# Patient Record
Sex: MALE | Race: WHITE | NOT HISPANIC OR LATINO | Employment: FULL TIME | URBAN - METROPOLITAN AREA
[De-identification: names, ages, dates, MRNs, and addresses within clinical notes are randomized per-mention and may not be internally consistent; named-entity substitution may affect disease eponyms.]

---

## 2023-11-14 ENCOUNTER — HOSPITAL ENCOUNTER (EMERGENCY)
Facility: HOSPITAL | Age: 63
Discharge: HOME | End: 2023-11-14
Attending: EMERGENCY MEDICINE | Admitting: EMERGENCY MEDICINE
Payer: COMMERCIAL

## 2023-11-14 VITALS
OXYGEN SATURATION: 96 % | RESPIRATION RATE: 20 BRPM | DIASTOLIC BLOOD PRESSURE: 113 MMHG | TEMPERATURE: 97.9 F | SYSTOLIC BLOOD PRESSURE: 228 MMHG | HEART RATE: 85 BPM

## 2023-11-14 DIAGNOSIS — M54.9 BACK PAIN: Primary | ICD-10-CM

## 2023-11-14 DIAGNOSIS — I10 HYPERTENSION: ICD-10-CM

## 2023-11-14 PROCEDURE — 99282 EMERGENCY DEPT VISIT SF MDM: CPT

## 2023-11-14 PROCEDURE — 99284 EMERGENCY DEPT VISIT MOD MDM: CPT | Performed by: EMERGENCY MEDICINE

## 2023-11-14 RX ORDER — ATORVASTATIN CALCIUM 80 MG/1
80 TABLET, FILM COATED ORAL DAILY
COMMUNITY
Start: 2023-08-25 | End: 2023-11-14 | Stop reason: SDUPTHER

## 2023-11-14 RX ORDER — ASPIRIN 81 MG/1
81 TABLET ORAL DAILY
COMMUNITY
Start: 2023-08-25 | End: 2023-11-14 | Stop reason: SDUPTHER

## 2023-11-14 RX ORDER — CLOPIDOGREL BISULFATE 75 MG/1
75 TABLET ORAL DAILY
Qty: 14 TABLET | Refills: 0 | Status: SHIPPED | OUTPATIENT
Start: 2023-11-14 | End: 2023-11-28

## 2023-11-14 RX ORDER — ATORVASTATIN CALCIUM 80 MG/1
80 TABLET, FILM COATED ORAL DAILY
Qty: 14 TABLET | Refills: 0 | Status: SHIPPED | OUTPATIENT
Start: 2023-11-14 | End: 2023-11-28

## 2023-11-14 RX ORDER — ASPIRIN 81 MG/1
81 TABLET ORAL DAILY
Qty: 14 TABLET | Refills: 0 | Status: SHIPPED | OUTPATIENT
Start: 2023-11-14 | End: 2023-11-28

## 2023-11-14 RX ORDER — METHOCARBAMOL 500 MG/1
500 TABLET, FILM COATED ORAL 2 TIMES DAILY
Qty: 20 TABLET | Refills: 0 | Status: SHIPPED | OUTPATIENT
Start: 2023-11-14

## 2023-11-14 RX ORDER — CLOPIDOGREL BISULFATE 75 MG/1
75 TABLET ORAL DAILY
COMMUNITY
Start: 2023-08-25 | End: 2023-11-14 | Stop reason: SDUPTHER

## 2023-11-14 RX ORDER — LIDOCAINE 50 MG/G
1 PATCH TOPICAL DAILY
Qty: 14 PATCH | Refills: 0 | Status: SHIPPED | OUTPATIENT
Start: 2023-11-14 | End: 2023-11-28

## 2023-11-14 RX ORDER — LIDOCAINE 50 MG/G
1 PATCH TOPICAL ONCE
Status: DISCONTINUED | OUTPATIENT
Start: 2023-11-14 | End: 2023-11-14 | Stop reason: HOSPADM

## 2023-11-14 RX ADMIN — LIDOCAINE 1 PATCH: 700 PATCH TOPICAL at 07:39

## 2023-11-14 NOTE — Clinical Note
Rosalie Chico was seen and treated in our emergency department on 11/14/2023. No restrictions            Diagnosis:     Althea Dial  may return to work on return date. He may return on this date: 11/15/2023         If you have any questions or concerns, please don't hesitate to call.       Albania Meza, DO    ______________________________           _______________          _______________  Hospital Representative                              Date                                Time

## 2023-11-14 NOTE — ED PROVIDER NOTES
History  Chief Complaint   Patient presents with    Back Pain     Pt reports R lower back pain for 3 months, was on way to work when he decided to stop in. HPI  78-year-old male with history of hepatitis C, PAD  status post left femoral endarterectomy with femoral above-knee popliteal bypass, CAD, hyperlipidemia, hypertension presents to the ED for intermittent right low back pain x3 months. Patient reports pain worse with movement specifically bending over with picking things up at work. Patient reports taking Aleve for pain without much relief yesterday. Patient reports pain so bad yesterday unable to go to work. Currently asymptomatic. Patient denies fever, headache, vision changes, speech changes, focal motor weakness, bladder/bowel incontinence, saddle anesthesia. Prior to Admission Medications   Prescriptions Last Dose Informant Patient Reported? Taking?   aspirin (ECOTRIN LOW STRENGTH) 81 mg EC tablet   Yes Yes   Sig: Take 81 mg by mouth daily   aspirin (ECOTRIN LOW STRENGTH) 81 mg EC tablet   No Yes   Sig: Take 1 tablet (81 mg total) by mouth daily for 14 days   atorvastatin (LIPITOR) 80 mg tablet   Yes Yes   Sig: Take 80 mg by mouth daily   atorvastatin (LIPITOR) 80 mg tablet   No Yes   Sig: Take 1 tablet (80 mg total) by mouth daily for 14 days   clopidogrel (PLAVIX) 75 mg tablet   Yes Yes   Sig: Take 75 mg by mouth daily   clopidogrel (PLAVIX) 75 mg tablet   No Yes   Sig: Take 1 tablet (75 mg total) by mouth daily for 14 days   ibuprofen (MOTRIN) 600 mg tablet   No No   Sig: Take 1 tablet (600 mg total) by mouth every 6 (six) hours as needed for mild pain.    metoprolol tartrate (LOPRESSOR) 25 mg tablet   Yes Yes   Sig: Take 25 mg by mouth 2 (two) times a day   metoprolol tartrate (LOPRESSOR) 25 mg tablet   No Yes   Sig: Take 1 tablet (25 mg total) by mouth 2 (two) times a day for 14 days      Facility-Administered Medications: None       Past Medical History:   Diagnosis Date    Blocked artery Rt leg    No known health problems        Past Surgical History:   Procedure Laterality Date    NO PAST SURGERIES         History reviewed. No pertinent family history. I have reviewed and agree with the history as documented. E-Cigarette/Vaping     E-Cigarette/Vaping Substances     Social History     Tobacco Use    Smoking status: Every Day   Substance Use Topics    Alcohol use: Yes    Drug use: No        Review of Systems   Constitutional:  Negative for chills and fever. HENT:  Negative for ear pain and sore throat. Respiratory:  Negative for cough and shortness of breath. Cardiovascular:  Negative for chest pain, palpitations and leg swelling. Gastrointestinal:  Negative for abdominal pain, diarrhea, nausea and vomiting. Genitourinary:  Negative for dysuria, frequency and hematuria. Musculoskeletal:  Positive for back pain. Negative for neck pain. Skin:  Negative for rash. Neurological:  Negative for dizziness, light-headedness and headaches. Physical Exam  ED Triage Vitals   Temperature Pulse Respirations Blood Pressure SpO2   11/14/23 0624 11/14/23 0623 11/14/23 0623 11/14/23 0624 11/14/23 0623   97.9 °F (36.6 °C) 85 20 (!) 228/113 96 %      Temp Source Heart Rate Source Patient Position - Orthostatic VS BP Location FiO2 (%)   11/14/23 0624 11/14/23 0623 11/14/23 0624 11/14/23 0624 --   Oral Monitor Sitting Right arm       Pain Score       --                    Orthostatic Vital Signs  Vitals:    11/14/23 0623 11/14/23 0624   BP:  (!) 228/113   Pulse: 85    Patient Position - Orthostatic VS:  Sitting       Physical Exam  Vitals reviewed. Constitutional:       General: He is awake. HENT:      Head: Normocephalic and atraumatic. Mouth/Throat:      Mouth: Mucous membranes are moist.      Comments: Poor dentition  Eyes:      Extraocular Movements: Extraocular movements intact. Right eye: No nystagmus. Left eye: No nystagmus.       Conjunctiva/sclera: Conjunctivae normal.      Pupils: Pupils are equal, round, and reactive to light. Cardiovascular:      Rate and Rhythm: Normal rate and regular rhythm. Pulses: Normal pulses. Heart sounds: Normal heart sounds, S1 normal and S2 normal. Heart sounds not distant. No murmur heard. No friction rub. No gallop. Pulmonary:      Breath sounds: No stridor. No wheezing, rhonchi or rales. Comments: CTA b/l   Abdominal:      General: Bowel sounds are normal.      Palpations: Abdomen is soft. Tenderness: There is no abdominal tenderness. Musculoskeletal:      Right lower leg: No edema. Left lower leg: No edema. Comments: No midline or paraspinal C-spine, T-spine or L-spine tenderness to palpation. Skin:     General: Skin is warm and dry. Capillary Refill: Capillary refill takes less than 2 seconds. Neurological:      Mental Status: He is alert and oriented to person, place, and time. GCS: GCS eye subscore is 4. GCS verbal subscore is 5. GCS motor subscore is 6. Cranial Nerves: Cranial nerves 2-12 are intact. Sensory: Sensation is intact. Motor: No weakness or pronator drift. Coordination: Coordination normal. Finger-Nose-Finger Test normal.         ED Medications  Medications   lidocaine (LIDODERM) 5 % patch 1 patch (has no administration in time range)       Diagnostic Studies  Results Reviewed       None                   No orders to display         Procedures  Procedures      ED Course                                       Medical Decision Making  Risk  OTC drugs. Prescription drug management. Patient is 59 y.o. male with PMH of hepatitis C, PAD  status post left femoral endarterectomy with femoral above-knee popliteal bypass, CAD, hyperlipidemia, hypertension presents to the ED for intermittent right low back pain     Vital signs markedly hypertensive. On exam no acute distress. Head atraumatic, normocephalic. PERRL, EOMI, conjunctive clear.   Poor dentition. Heart RRR, no murmurs, rubs, gallops. Presents clear to auscultation bilaterally. No rales, wheezing, rhonchi. Abdomen soft nontender. Cranial nerves II to XII intact, GCS 15. Alert and oriented x4.  5/5 strength in all extremities. Sensation intact in upper and lower extremities. Bedside ultrasound showed 2 cm abdominal aorta at level of renal arteries and umbilicus, 5.20 cm abdominal aorta just superior to iliac bifurcation. Differential diagnosis included but not limited to musculoskeletal, AAA. Cauda equina unlikely given no red flag symptoms. . Plan will prescribe lidocaine patches and Robaxin for musculoskeletal back pain and refer to comprehensive spine program.  Patient reports ran out of medications 1 week ago, this explains marked hypertension, will give refill for 2-week supply of antihypertensives and other maintenance medications and refer to family practice to establish primary care. View ED course above for further discussion on patient workup. On review of previous records patient has not seen family practice several years. Has been following up for medical problems at [facility].    All labs reviewed and utilized in the medical decision making process  All radiology studies independently viewed by me and interpreted by the radiologist.  I reviewed all testing with the patient. Upon re-evaluation patient remains pain-free. Amenable to plan for prescription pain medications for musculoskeletal back pain and follow-up with comprehensive spine program as well as establishing primary care for further management of maintenance medications. I have reviewed the patient's vital signs, nursing notes, and other relevant tests/information. I had a detailed discussion with the patient regarding the history, exam findings, and any diagnostic results.    Plan to discharge home in stable condition with back pain, hypertension, follow up with comprehensive spine program and family practice. Discussed with patient who is agreeable to plan. I discussed discharge instructions, need for follow-up, and oral return precautions for what to return for in addition to the written return precautions and discharge instructions, specifically highlighting areas of special concern. The patient verbalized understanding of the discharge instructions and warnings that would necessitate return to the Emergency Department including worsening back pain, difficulty walking, fevers, numbness, tingling, incontinence, or any other concerning symptoms. .  All questions the patient had were answered prior to discharge to the best of my ability. Disposition  Final diagnoses:   Back pain   Hypertension     Time reflects when diagnosis was documented in both MDM as applicable and the Disposition within this note       Time User Action Codes Description Comment    11/14/2023  7:17 AM Karley Crutch Add [M54.9] Back pain     11/14/2023  7:17 AM Karley Crutch Add [I10] Hypertension           ED Disposition       ED Disposition   Discharge    Condition   Stable    Date/Time   Tue Nov 14, 2023  7:18 AM    Comment   [facility] discharge to home/self care.                    Follow-up Information       Follow up With Specialties Details Why Contact Info Additional [facility] Emergency Department Emergency Medicine Go to  If symptoms worsen [address] [address]  [address] [facility] Emergency Department, [address] [place] [address], [facility], [place], [address]            Patient's Medications   Discharge Prescriptions    LIDOCAINE (LIDODERM) 5 %    Apply 1 patch topically over 12 hours daily for 14 days Remove & Discard patch within 12 hours or as directed by MD       Start Date: 11/14/2023End Date: 11/28/2023       Order Dose: 1 patch       Quantity: 14 patch    Refills: 0    METHOCARBAMOL (ROBAXIN) 500 MG TABLET    Take 1 tablet (500 mg total) by mouth 2 (two) times a day       Start Date: 11/14/2023End Date: --       Order Dose: 500 mg       Quantity: 20 tablet    Refills: 0         PDMP Review       None             ED Provider  Attending physically available and evaluated Forrestwallace Galo. I managed the patient along with the ED Attending.     Electronically Signed by           Tara Veliz DO  11/14/23 8725

## 2023-11-14 NOTE — ED ATTENDING ATTESTATION
11[facility]14/2023  [facility]. I have discussed the patient with the resident/non-physician practitioner and agree with the resident's/non-physician practitioner's findings, Plan of Care, and MDM as documented in the resident's/non-physician practitioner's note, except where noted. All available labs and Radiology studies were reviewed. I was present for key portions of any procedure(s) performed by the resident/non-physician practitioner and I was immediately available to provide assistance. At this point I agree with the current assessment done in the Emergency Department. I have conducted an independent evaluation of this patient a history and physical is as follows:      A 58 yo male with pmhx of hepatitis C, PAD (s/p left femoral endartectomy with femoral above knee popliteal bypass), CAD and HLD; presents with right-sided low back pain that has been intermittent for the past 3 months. Patient localizes the pain to the right sacroiliac joint. Patient is currently asymptomatic. Pain does not radiate into the abdomen or down the right lower extremity. Pain is worse with movement, specifically flexing at the hips. He denies associated fever, chills, chest pain, shortness of breath, abdominal pain, nausea, vomiting, diarrhea, peripheral edema, rashes, paresthesias, focal weakness and bowel/bladder incontinence. He has been taking Aleve without relief. He also reports being off all his medications over the past few weeks.     Physical Exam  General Appearance: alert and oriented, nad, non toxic appearing  Skin:  Warm, dry, intact  HEENT: atraumatic, normocephalic  Neck: Supple, trachea midline  Cardiac: RRR; no murmurs, rub, gallops  Pulmonary: lungs CTAB; no wheezes, rales, rhonchi  Gastrointestinal: abdomen soft, nontender, nondistended; no guarding or rebound tenderness; good bowel sounds, no mass or bruits  Extremities:  No midline spinal or paraspinal tenderness. Full ROM to bilateral lower extremities. No pedal edema, 2+ pulses; no calf tenderness, no clubbing, no cyanosis  Neuro:  no focal motor or sensory deficits, CN 2-12 grossly intact  Psych:  Normal mood and affect, normal judgement and insight    Assessment and Plan:  1.)  Right-sided low back pain, patient currently asymptomatic with a benign exam.  Suspect pain is musculoskeletal in nature. Bedside aortic US completed, aorta measures < 3 cm throughout. Recommend continued symptomatic treatment. Will refer to the comprehensive spine program  2.)  Chronic hypertension, patient reports being off his medications for at least the past week. Discussed risks of uncontrolled hypertension. Will provide 2-week supply of his medications, recommend PCP follow-up.     ED Course         Critical Care Time  Procedures

## 2023-11-14 NOTE — DISCHARGE INSTRUCTIONS
You were evaluated in the Emergency Department today for your back pain. Your evaluation did not show signs of medical conditions requiring emergent intervention at this time. We recommend you take 600mg ibuprofen every 6 hours or tylenol 650mg every 6 hours as needed for pain. If needed, you can alternate these medications so that you take one medication every 3 hours. For instance, at noon take ibuprofen, then at 3pm take tylenol, then at 6pm take ibuprofen. Please take your prescribed robaxin and lidocaine patches as prescribed. Please establish primary care to manage your daily medications. Return to the Emergency Department if you experience worsening back pain, difficulty walking, fevers, numbness, tingling, incontinence, or any other concerning symptoms.

## 2023-11-15 ENCOUNTER — TELEPHONE (OUTPATIENT)
Dept: PHYSICAL THERAPY | Facility: OTHER | Age: 63
End: 2023-11-15

## 2023-11-15 NOTE — TELEPHONE ENCOUNTER
Call placed to the patient per Comprehensive Spine Program referral.    Voice message left for patient to call back. Phone number and hours of business provided. Left brief message, since outgoing name on VM was for a Zeniavince Hodges(sister)    This is the 1st attempt to reach the patient. Will defer per protocol.

## 2024-07-27 ENCOUNTER — HOSPITAL ENCOUNTER (EMERGENCY)
Facility: HOSPITAL | Age: 64
Discharge: HOME | End: 2024-07-28
Attending: EMERGENCY MEDICINE
Payer: COMMERCIAL

## 2024-07-27 ENCOUNTER — APPOINTMENT (EMERGENCY)
Dept: CT IMAGING | Facility: HOSPITAL | Age: 64
End: 2024-07-27
Payer: COMMERCIAL
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
## 2024-07-27 DIAGNOSIS — S09.90XA INJURY OF HEAD, INITIAL ENCOUNTER: Primary | ICD-10-CM

## 2024-07-27 DIAGNOSIS — F10.929 ALCOHOL INTOXICATION (HCC): ICD-10-CM

## 2024-07-27 PROCEDURE — 70450 CT HEAD/BRAIN W/O DYE: CPT

## 2024-07-27 PROCEDURE — 99284 EMERGENCY DEPT VISIT MOD MDM: CPT

## 2024-07-27 PROCEDURE — 99284 EMERGENCY DEPT VISIT MOD MDM: CPT | Performed by: EMERGENCY MEDICINE

## 2024-07-27 RX ORDER — LIDOCAINE HYDROCHLORIDE 10 MG/ML
5 INJECTION, SOLUTION EPIDURAL; INFILTRATION; INTRACAUDAL; PERINEURAL ONCE
Status: COMPLETED | OUTPATIENT
Start: 2024-07-27 | End: 2024-07-27

## 2024-07-27 RX ADMIN — LIDOCAINE HYDROCHLORIDE 5 ML: 10 INJECTION, SOLUTION EPIDURAL; INFILTRATION; INTRACAUDAL at 18:04
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
## 2024-07-27 NOTE — Clinical Note
Saulo Hodges was seen and treated in our emergency department on 7/27/2024.    No restrictions            Diagnosis:     Saulo  may return to work on return date.    He may return on this date: 07/30/2024         If you have any questions or concerns, please don't hesitate to call.      Martha Ortega RN    ______________________________           _______________          _______________  Hospital Representative                              Date                                Time

## 2024-07-28 VITALS
OXYGEN SATURATION: 97 % | WEIGHT: 168.87 LBS | TEMPERATURE: 98.5 F | BODY MASS INDEX: 26.06 KG/M2 | RESPIRATION RATE: 18 BRPM | HEART RATE: 66 BPM | DIASTOLIC BLOOD PRESSURE: 69 MMHG | SYSTOLIC BLOOD PRESSURE: 152 MMHG

## 2024-07-28 PROCEDURE — 90471 IMMUNIZATION ADMIN: CPT

## 2024-07-28 PROCEDURE — 90715 TDAP VACCINE 7 YRS/> IM: CPT | Performed by: EMERGENCY MEDICINE

## 2024-07-28 RX ADMIN — TETANUS TOXOID, REDUCED DIPHTHERIA TOXOID AND ACELLULAR PERTUSSIS VACCINE, ADSORBED 0.5 ML: 5; 2.5; 8; 8; 2.5 SUSPENSION INTRAMUSCULAR at 02:05
